# Patient Record
Sex: MALE | Race: WHITE
[De-identification: names, ages, dates, MRNs, and addresses within clinical notes are randomized per-mention and may not be internally consistent; named-entity substitution may affect disease eponyms.]

---

## 2017-02-01 ENCOUNTER — HOSPITAL ENCOUNTER (OUTPATIENT)
Dept: HOSPITAL 58 - RAD | Age: 22
Discharge: HOME | End: 2017-02-01
Attending: FAMILY MEDICINE

## 2017-02-01 VITALS — BODY MASS INDEX: 24 KG/M2

## 2017-02-01 DIAGNOSIS — M25.561: Primary | ICD-10-CM
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
## 2017-02-01 NOTE — DI
Exam:  Four views right knee. 

  

linical indication:  Right knee pain post fall. 

  

Findings: 

There is no right knee effusion. 

  

There are no fractures, dislocations or other significant bony abnormalities. 

  

Impression: 

Negative radiographs of the right knee.

## 2018-02-07 ENCOUNTER — HOSPITAL ENCOUNTER (OUTPATIENT)
Dept: HOSPITAL 58 - LAB | Age: 23
Discharge: HOME | End: 2018-02-07
Attending: INTERNAL MEDICINE

## 2018-02-07 VITALS — BODY MASS INDEX: 24 KG/M2

## 2018-02-07 DIAGNOSIS — I10: Primary | ICD-10-CM

## 2018-02-07 PROCEDURE — 36415 COLL VENOUS BLD VENIPUNCTURE: CPT

## 2018-02-07 PROCEDURE — 80053 COMPREHEN METABOLIC PANEL: CPT

## 2018-02-07 PROCEDURE — 85025 COMPLETE CBC W/AUTO DIFF WBC: CPT

## 2018-02-16 ENCOUNTER — HOSPITAL ENCOUNTER (OUTPATIENT)
Dept: HOSPITAL 58 - LAB | Age: 23
Discharge: HOME | End: 2018-02-16
Attending: NURSE PRACTITIONER

## 2018-02-16 VITALS — BODY MASS INDEX: 24 KG/M2

## 2018-02-16 DIAGNOSIS — R21: Primary | ICD-10-CM

## 2018-02-16 PROCEDURE — 87800 DETECT AGNT MULT DNA DIREC: CPT

## 2018-02-16 PROCEDURE — 80074 ACUTE HEPATITIS PANEL: CPT

## 2018-02-16 PROCEDURE — 86592 SYPHILIS TEST NON-TREP QUAL: CPT

## 2018-02-16 PROCEDURE — 86631 CHLAMYDIA ANTIBODY: CPT

## 2018-02-16 PROCEDURE — 86695 HERPES SIMPLEX TYPE 1 TEST: CPT

## 2018-02-16 PROCEDURE — 36415 COLL VENOUS BLD VENIPUNCTURE: CPT

## 2018-02-16 PROCEDURE — 86696 HERPES SIMPLEX TYPE 2 TEST: CPT

## 2018-02-16 PROCEDURE — 80053 COMPREHEN METABOLIC PANEL: CPT

## 2018-02-16 PROCEDURE — 81001 URINALYSIS AUTO W/SCOPE: CPT

## 2018-02-16 PROCEDURE — 86701 HIV-1ANTIBODY: CPT

## 2018-03-26 ENCOUNTER — HOSPITAL ENCOUNTER (OUTPATIENT)
Dept: HOSPITAL 58 - RAD | Age: 23
Discharge: HOME | End: 2018-03-26
Attending: INTERNAL MEDICINE

## 2018-03-26 VITALS — BODY MASS INDEX: 24 KG/M2

## 2018-03-26 DIAGNOSIS — J40: Primary | ICD-10-CM

## 2018-03-26 NOTE — DI
EXAM:  Two views of the chest. 

  

History:  Bronchitis. 

  

Comparison:  Chest radiograph 09/07/2016 

  

Findings:  Heart size is within normal limits.  No focal consolidation.  No appreciable pleural fluid
 and no pneumothorax.  No acute osseous abnormalities. 

  

Impression:  No acute cardiopulmonary process.